# Patient Record
Sex: MALE | Race: OTHER
[De-identification: names, ages, dates, MRNs, and addresses within clinical notes are randomized per-mention and may not be internally consistent; named-entity substitution may affect disease eponyms.]

---

## 2020-11-07 ENCOUNTER — HOSPITAL ENCOUNTER (OUTPATIENT)
Dept: HOSPITAL 4 - U | Age: 55
Discharge: HOME | End: 2020-11-07
Payer: COMMERCIAL

## 2020-11-07 DIAGNOSIS — Z20.828: Primary | ICD-10-CM

## 2020-11-07 PROCEDURE — U0003 INFECTIOUS AGENT DETECTION BY NUCLEIC ACID (DNA OR RNA); SEVERE ACUTE RESPIRATORY SYNDROME CORONAVIRUS 2 (SARS-COV-2) (CORONAVIRUS DISEASE [COVID-19]), AMPLIFIED PROBE TECHNIQUE, MAKING USE OF HIGH THROUGHPUT TECHNOLOGIES AS DESCRIBED BY CMS-2020-01-R: HCPCS

## 2020-11-07 PROCEDURE — C9803 HOPD COVID-19 SPEC COLLECT: HCPCS

## 2022-06-02 ENCOUNTER — HOSPITAL ENCOUNTER (EMERGENCY)
Dept: HOSPITAL 4 - SED | Age: 57
Discharge: HOME | End: 2022-06-02
Payer: COMMERCIAL

## 2022-06-02 VITALS — SYSTOLIC BLOOD PRESSURE: 130 MMHG

## 2022-06-02 VITALS — SYSTOLIC BLOOD PRESSURE: 128 MMHG

## 2022-06-02 VITALS — WEIGHT: 170 LBS | HEIGHT: 64 IN | BODY MASS INDEX: 29.02 KG/M2

## 2022-06-02 DIAGNOSIS — I10: ICD-10-CM

## 2022-06-02 DIAGNOSIS — Z79.899: ICD-10-CM

## 2022-06-02 DIAGNOSIS — U07.1: Primary | ICD-10-CM

## 2022-06-02 NOTE — NUR
Patient complains of chills, body aches, nasal congestion, dry sratchy cough 
today started approximately 1700 hrs. Patient denies shortness of breath, 
denies any pain. Patient tested positive of Covid 19 today. Patient breathing 
easy, respirations even unlabored. No other remarkable symptoms noted.

## 2022-06-02 NOTE — NUR
Patient given written and verbal discharge instructions and verbalizes 
understanding. Patient in stable condition. ID arm band removed. Rx of 
nirmatrelvir 300mg po bid  ritonavir 100mg po bid given.